# Patient Record
Sex: MALE | Race: WHITE | URBAN - METROPOLITAN AREA
[De-identification: names, ages, dates, MRNs, and addresses within clinical notes are randomized per-mention and may not be internally consistent; named-entity substitution may affect disease eponyms.]

---

## 2017-03-23 DIAGNOSIS — I78.1 NEVUS, NON-NEOPLASTIC: ICD-10-CM

## 2017-03-23 LAB — HEMOCCULT STL QL IA: ABNORMAL

## 2017-03-24 ENCOUNTER — MYC MEDICAL ADVICE (OUTPATIENT)
Dept: LAB | Facility: CLINIC | Age: 52
End: 2017-03-24

## 2017-03-24 NOTE — TELEPHONE ENCOUNTER
FIT test given to patient in 2016  in 2016, received at hospital this week. Test canceled.  Ikro message sent to patient.

## 2017-04-21 ENCOUNTER — OFFICE VISIT (OUTPATIENT)
Dept: FAMILY MEDICINE | Facility: CLINIC | Age: 52
End: 2017-04-21
Payer: COMMERCIAL

## 2017-04-21 DIAGNOSIS — Z83.49 FAMILY HISTORY OF HEMOCHROMATOSIS: ICD-10-CM

## 2017-04-21 DIAGNOSIS — D23.5 BENIGN NEOPLASM OF SKIN OF TRUNK, EXCEPT SCROTUM: ICD-10-CM

## 2017-04-21 DIAGNOSIS — Z12.11 SPECIAL SCREENING FOR MALIGNANT NEOPLASMS, COLON: ICD-10-CM

## 2017-04-21 DIAGNOSIS — Z13.6 CARDIOVASCULAR SCREENING; LDL GOAL LESS THAN 160: ICD-10-CM

## 2017-04-21 DIAGNOSIS — N52.9 ERECTILE DYSFUNCTION, UNSPECIFIED ERECTILE DYSFUNCTION TYPE: ICD-10-CM

## 2017-04-21 DIAGNOSIS — R03.0 ELEVATED BLOOD PRESSURE READING WITHOUT DIAGNOSIS OF HYPERTENSION: Primary | ICD-10-CM

## 2017-04-21 LAB — TRANSFERRIN SERPL-MCNC: 301 MG/DL (ref 210–360)

## 2017-04-21 PROCEDURE — 84466 ASSAY OF TRANSFERRIN: CPT | Performed by: FAMILY MEDICINE

## 2017-04-21 PROCEDURE — 36415 COLL VENOUS BLD VENIPUNCTURE: CPT | Performed by: FAMILY MEDICINE

## 2017-04-21 PROCEDURE — 99214 OFFICE O/P EST MOD 30 MIN: CPT | Performed by: FAMILY MEDICINE

## 2017-04-21 PROCEDURE — 82728 ASSAY OF FERRITIN: CPT | Performed by: FAMILY MEDICINE

## 2017-04-21 PROCEDURE — 80076 HEPATIC FUNCTION PANEL: CPT | Performed by: FAMILY MEDICINE

## 2017-04-21 RX ORDER — TADALAFIL 5 MG/1
5 TABLET ORAL DAILY
Qty: 30 TABLET | Refills: 3 | Status: SHIPPED | OUTPATIENT
Start: 2017-04-21 | End: 2017-08-03

## 2017-04-21 NOTE — NURSING NOTE
"Chief Complaint   Patient presents with     Hypertension     follow up     Mole     screening        Initial /90 (BP Location: Left arm, Patient Position: Chair, Cuff Size: Adult Regular)  Pulse 81  Temp 99.1  F (37.3  C) (Tympanic)  Ht 6' 0.9\" (1.852 m)  Wt 205 lb (93 kg)  SpO2 97%  BMI 27.12 kg/m2 Estimated body mass index is 27.12 kg/(m^2) as calculated from the following:    Height as of this encounter: 6' 0.9\" (1.852 m).    Weight as of this encounter: 205 lb (93 kg).  Medication Reconciliation: complete Johnson Villavicencio MA      "

## 2017-04-21 NOTE — PROGRESS NOTES
"  SUBJECTIVE:                                                    Dar Goodwin is a 51 year old male who presents to clinic today for the following health issues:      Hypertension Follow-up      Outpatient blood pressures are not being checked.    Low Salt Diet: not monitoring salt       Amount of exercise or physical activity: 6-7 days/week for an average of 45-60 minutes    Problems taking medications regularly: No    Medication side effects: none    Diet: regular (no restrictions)      Pt would like some moles to be look at; stomach area.     Also, pt bought in some lab result from work to review.     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  Happy with that.   Family history of hemochromatosis: in mother.  Gets phelbotomy.  Wonders if needs screening.    Benign neoplasm of skin of trunk, except scrotum: has some \"moles\" on stomach he would like looked at.  Have not changed that he knows of.   CARDIOVASCULAR SCREENING; LDL GOAL LESS THAN 160  Special screening for malignant neoplasms, colon  Erectile dysfunction, unspecified erectile dysfunction type :rx desired.  Expensive.      Medical, social, surgical, and family histories reviewed.      REVIEW OF SYSTEMS FOR GENERAL, RESPIRATORY, CV, GI AND PSYCHIATRIC NEGATIVE EXCEPT AS NOTED IN HPI.         OBJECTIVE:  /90 (BP Location: Left arm, Patient Position: Chair, Cuff Size: Adult Regular)  Pulse 81  Temp 99.1  F (37.3  C) (Tympanic)  Ht 6' 0.9\" (1.852 m)  Wt 205 lb (93 kg)  SpO2 97%  BMI 27.12 kg/m2    EXAM:  GENERAL APPEARANCE: healthy, alert and no distress  RESP: lungs clear to auscultation - no rales, rhonchi or wheezes  CV: regular rates and rhythm, normal S1 S2, no S3 or S4 and no murmur, click or rub "   ABDOMEN:  On left lower abdomen has two 4-5 mm round, slightly raise, waxy slightly verrucous lesions consistent with seborrheic keratoses.    blood pressue re-check 134/78 by me.     ASSESSMENT AND PLAN  Patient Instructions   ASSESSMENT AND PLAN  1. Elevated blood pressure reading without diagnosis of hypertension  MARY ANNE check 128/76 and other checks at goal he has done.  Diastolic number just slightly high today initially.     Follow up 6-12 months, no medications for now.       2. Family history of hemochromatosis  Check ferritin, if high will do genetic testing. Discussed risks/benefits of genetic testing including having  A pre-existing condition.     Please ask your mother's hemachromatosis provider if you should have the genetic testing even if the ferritin is low.     - Ferritin    3. Benign neoplasm of skin of trunk, except scrotum  Reassurance, have appearance of seborrheic keratosis on abdomen.  If changing substantially then let me know and we will biopsy.     4. CARDIOVASCULAR SCREENING; LDL GOAL LESS THAN 160      5. Special screening for malignant neoplasms, colon  Fit test now and will still plan to do colonoscopy.       More than 1/2 of 25 minutes spent counseling/coordination of care today.       Joel Wegener,MD

## 2017-04-21 NOTE — PATIENT INSTRUCTIONS
ASSESSMENT AND PLAN  1. Elevated blood pressure reading without diagnosis of hypertension  MARY ANNE check 128/76 and other checks at goal he has done.  Diastolic number just slightly high today.     Follow up 6-12 months, no medications for now.       2. Family history of hemochromatosis  Check ferritin, if high will do genetic testing.     Please ask your mother's hemachromatosis provider if you should have the genetic testing even if the ferritin is low.     - Ferritin    3. Benign neoplasm of skin of trunk, except scrotum  Reassurance, have appearance of seborrheic keratosis on abdomen.  If changing substantially then let me know and we will biopsy.     4. CARDIOVASCULAR SCREENING; LDL GOAL LESS THAN 160      5. Special screening for malignant neoplasms, colon  Fit test now and will still plan to do colonoscopy.

## 2017-04-21 NOTE — TELEPHONE ENCOUNTER
Medication Detail      Disp Refills Start End MARIO   tadalafil (CIALIS) 5 MG tablet (Discontinued) 30 tablet 8 4/20/2016 4/21/2017 No   Sig: Take 1 tablet (5 mg) by mouth daily Never use with nitroglycerin, terazosin or doxazosin. Due for follow up.   Class: E-Prescribe   Route: Oral   Reason for Discontinue: Reorder   Order: [id number]       Last Office Visit with [facility] or  [employer] prescribing provider: 4/21/2017  Future Office visit:       Routing refill request to provider for review/approval because:  Drug not on the [facility], [facility] or [employer] refill protocol or controlled substance

## 2017-04-21 NOTE — MR AVS SNAPSHOT
After Visit Summary   4/21/2017    Dar Goodwin    MRN: [id number]           Patient Information     Date Of Birth          1965        Visit Information        Provider Department      4/21/2017 3:00 PM Wegener, Joel Daniel Irwin, MD [facility]        Today's Diagnoses     Elevated blood pressure reading without diagnosis of hypertension    -  1    Family history of hemochromatosis        Benign neoplasm of skin of trunk, except scrotum        CARDIOVASCULAR SCREENING; LDL GOAL LESS THAN 160        Special screening for malignant neoplasms, colon          Care Instructions    ASSESSMENT AND PLAN  1. Elevated blood pressure reading without diagnosis of hypertension  MARY ANNE check 128/76 and other checks at goal he has done.  Diastolic number just slightly high today.     Follow up 6-12 months, no medications for now.       2. Family history of hemochromatosis  Check ferritin, if high will do genetic testing.     Please ask your mother's hemachromatosis provider if you should have the genetic testing even if the ferritin is low.     - Ferritin    3. Benign neoplasm of skin of trunk, except scrotum  Reassurance, have appearance of seborrheic keratosis on abdomen.  If changing substantially then let me know and we will biopsy.     4. CARDIOVASCULAR SCREENING; LDL GOAL LESS THAN 160      5. Special screening for malignant neoplasms, colon  Fit test now and will still plan to do colonoscopy.             Follow-ups after your visit        Who to contact     If you have questions or need follow up information about today's clinic visit or your schedule please contact [facility] directly at [phone].  Normal or non-critical lab and imaging results will be communicated to you by MyChart, letter or phone within 4 business days after the clinic has received the results. If you do not hear from us within 7 days, please contact the clinic through MyChart or phone. If you have  "a critical or abnormal lab result, we will notify you by phone as soon as possible.  Submit refill requests through [employer] or call your pharmacy and they will forward the refill request to us. Please allow 3 business days for your refill to be completed.          Additional Information About Your Visit        [employer] Information     [employer] gives you secure access to your electronic health record. If you see a primary care provider, you can also send messages to your care team and make appointments. If you have questions, please call your primary care clinic.  If you do not have a primary care provider, please call [phone] and they will assist you.        Care EveryWhere ID     This is your Care EveryWhere ID. This could be used by other organizations to access your [place] medical records  [id number]        Your Vitals Were     Pulse Temperature Height Pulse Oximetry BMI (Body Mass Index)       81 99.1  F (37.3  C) (Tympanic) 6' 0.9\" (1.852 m) 97% 27.12 kg/m2        Blood Pressure from Last 3 Encounters:   04/21/17 130/90   01/22/16 140/85   10/20/14 (!) 140/96    Weight from Last 3 Encounters:   04/21/17 205 lb (93 kg)   01/22/16 207 lb (93.9 kg)   10/20/14 199 lb (90.3 kg)              We Performed the Following     Ferritin        Primary Care Provider Office Phone # Fax #    Joel Daniel Irwin Wegener, [phone]       [address] [address]        Thank you!     Thank you for choosing [facility]  for your care. Our goal is always to provide you with excellent care. Hearing back from our patients is one way we can continue to improve our services. Please take a few minutes to complete the written survey that you may receive in the mail after your visit with us. Thank you!             Your Updated Medication List - Protect others around you: Learn how to safely use, store and throw away your medicines at www.disposemymeds.org.          This " list is accurate as of: 4/21/17  3:27 PM.  Always use your most recent med list.                   Brand Name Dispense Instructions for use    ALEVE PO          dexamethasone 4 MG/ML injection    DECADRON    2 mL    Inject 1 mL (4 mg) as directed once for 1 dose       tadalafil 5 MG tablet    CIALIS    30 tablet    Take 1 tablet (5 mg) by mouth daily Never use with nitroglycerin, terazosin or doxazosin. Due for follow up.

## 2017-04-22 LAB
ALBUMIN SERPL-MCNC: 4.7 G/DL (ref 3.4–5)
ALP SERPL-CCNC: 89 U/L (ref 40–150)
ALT SERPL W P-5'-P-CCNC: 34 U/L (ref 0–70)
AST SERPL W P-5'-P-CCNC: 24 U/L (ref 0–45)
BILIRUB DIRECT SERPL-MCNC: 0.2 MG/DL (ref 0–0.2)
BILIRUB SERPL-MCNC: 0.8 MG/DL (ref 0.2–1.3)
FERRITIN SERPL-MCNC: 250 NG/ML (ref 26–388)
PROT SERPL-MCNC: 8.4 G/DL (ref 6.8–8.8)

## 2017-04-23 VITALS
HEIGHT: 73 IN | WEIGHT: 205 LBS | OXYGEN SATURATION: 97 % | HEART RATE: 81 BPM | SYSTOLIC BLOOD PRESSURE: 134 MMHG | BODY MASS INDEX: 27.17 KG/M2 | TEMPERATURE: 99.1 F | DIASTOLIC BLOOD PRESSURE: 78 MMHG

## 2017-04-24 NOTE — TELEPHONE ENCOUNTER
This was just filled on 4/21/17 (Friday)  This refill is saying a PA is needed.  Provider please sign refill to start PA if that is plan.  Phyllis Cox RN

## 2017-04-25 NOTE — TELEPHONE ENCOUNTER
As far as I know a prior auth is only allowed for pulmonary hypertension and/or BENIGN PROSTATIC HYPERTROPHY if three other standard BENIGN PROSTATIC HYPERTROPHY medications have been tried so generally don't do prior auth's for this.     Does he know differently and want me to try?     Joel Wegener,MD

## 2017-04-27 RX ORDER — TADALAFIL 5 MG/1
5 TABLET ORAL DAILY
Qty: 30 TABLET | Refills: 3 | Status: CANCELLED | OUTPATIENT
Start: 2017-04-27

## 2017-04-27 NOTE — TELEPHONE ENCOUNTER
Correct, insurance does not cover this medication for Erectile Dysfunction.  Patient called and is aware that he will need to pay earl.  Dr. Wegener, it looks like you escribed this RX, but there is still an open order for a refill.  Can you please delete and close encounter?  Thank you.    Marifer Alatorre CMA

## 2017-05-01 ENCOUNTER — TRANSFERRED RECORDS (OUTPATIENT)
Dept: HEALTH INFORMATION MANAGEMENT | Facility: CLINIC | Age: 52
End: 2017-05-01

## 2017-05-04 PROCEDURE — 82274 ASSAY TEST FOR BLOOD FECAL: CPT | Performed by: FAMILY MEDICINE

## 2017-05-05 DIAGNOSIS — Z12.11 SPECIAL SCREENING FOR MALIGNANT NEOPLASMS, COLON: ICD-10-CM

## 2017-05-07 LAB — HEMOCCULT STL QL IA: NEGATIVE

## 2017-08-03 DIAGNOSIS — N52.9 ERECTILE DYSFUNCTION, UNSPECIFIED ERECTILE DYSFUNCTION TYPE: ICD-10-CM

## 2017-08-03 RX ORDER — TADALAFIL 5 MG/1
5 TABLET ORAL DAILY
Qty: 30 TABLET | Refills: 11 | Status: SHIPPED | OUTPATIENT
Start: 2017-08-03 | End: 2018-03-02

## 2017-08-03 NOTE — TELEPHONE ENCOUNTER
Insurance does not cover this medication for erectile dysfunction, prior auth is not possible. I put note on prescription.     Joel Wegener,MD

## 2017-08-03 NOTE — TELEPHONE ENCOUNTER
Rx was denied for below medication/s:    Med Prescribed:  CIALIS 5 MG  Reason:  PLAN DOES NOT COVER THIS MED.  Recommendations from Insurance:  PA  Insurance Plan:  NOT GIVEN  Patient ID:  [id number]  BIN/RX#:  [id number]  Phone:  [phone]  Fax:       On current med list:  YES    Would you like to change Rx or start PA. If you would like to start PA please include any pertinent information as to why it is needed, other medications taken and reasons why other medication were discontinued.      Please forward to your MA pool.      Thank you,  Munira LOVE (R)(M)

## 2017-10-24 ENCOUNTER — TRANSFERRED RECORDS (OUTPATIENT)
Dept: HEALTH INFORMATION MANAGEMENT | Facility: CLINIC | Age: 52
End: 2017-10-24

## 2017-10-24 LAB
ALT SERPL-CCNC: 21 U/L (ref 9–46)
AST SERPL-CCNC: 23 U/L (ref 10–35)
CHOLEST SERPL-MCNC: 212 MG/DL
CREAT SERPL-MCNC: 0.96 MG/DL (ref 0.7–1.33)
GFR SERPL CREATININE-BSD FRML MDRD: 90 ML/MIN/1.73M2
GLUCOSE SERPL-MCNC: 89 MG/DL (ref 65–99)
HDLC SERPL-MCNC: 44 MG/DL
LDLC SERPL CALC-MCNC: 142 MG/DL
NONHDLC SERPL-MCNC: 168 MG/DL
POTASSIUM SERPL-SCNC: 4.2 MMOL/L (ref 3.5–5.3)
TRIGL SERPL-MCNC: 139 MG/DL

## 2017-11-07 ENCOUNTER — OFFICE VISIT (OUTPATIENT)
Dept: URGENT CARE | Facility: URGENT CARE | Age: 52
End: 2017-11-07
Payer: COMMERCIAL

## 2017-11-07 VITALS
OXYGEN SATURATION: 99 % | HEART RATE: 107 BPM | TEMPERATURE: 98.4 F | SYSTOLIC BLOOD PRESSURE: 130 MMHG | DIASTOLIC BLOOD PRESSURE: 90 MMHG

## 2017-11-07 DIAGNOSIS — J00 ACUTE NASOPHARYNGITIS: Primary | ICD-10-CM

## 2017-11-07 PROCEDURE — 99213 OFFICE O/P EST LOW 20 MIN: CPT | Performed by: FAMILY MEDICINE

## 2017-11-07 RX ORDER — DIPHENHYDRAMINE HYDROCHLORIDE AND LIDOCAINE HYDROCHLORIDE AND ALUMINUM HYDROXIDE AND MAGNESIUM HYDRO
5-10 KIT EVERY 6 HOURS PRN
Qty: 237 ML | Refills: 1 | Status: SHIPPED | OUTPATIENT
Start: 2017-11-07

## 2017-11-07 NOTE — MR AVS SNAPSHOT
After Visit Summary   11/7/2017    Dar Goodwin    MRN: [id number]           Patient Information     Date Of Birth          1965        Visit Information        Provider Department      11/7/2017 7:05 PM Danilo Lawton MD [facility]        Today's Diagnoses     Acute nasopharyngitis    -  1       Follow-ups after your visit        Who to contact     If you have questions or need follow up information about today's clinic visit or your schedule please contact [facility] directly at [phone].  Normal or non-critical lab and imaging results will be communicated to you by [employer], letter or phone within 4 business days after the clinic has received the results. If you do not hear from us within 7 days, please contact the clinic through [employer] or phone. If you have a critical or abnormal lab result, we will notify you by phone as soon as possible.  Submit refill requests through [employer] or call your pharmacy and they will forward the refill request to us. Please allow 3 business days for your refill to be completed.          Additional Information About Your Visit        MyChart Information     [employer] gives you secure access to your electronic health record. If you see a primary care provider, you can also send messages to your care team and make appointments. If you have questions, please call your primary care clinic.  If you do not have a primary care provider, please call [phone] and they will assist you.        Care EveryWhere ID     This is your Care EveryWhere ID. This could be used by other organizations to access your [place] medical records  [id number]        Your Vitals Were     Pulse Temperature Pulse Oximetry             107 98.4  F (36.9  C) (Oral) 99%          Blood Pressure from Last 3 Encounters:   11/07/17 130/90   04/21/17 134/78   01/22/16 140/85    Weight from Last 3 Encounters:   04/21/17 205 lb (93 kg)   01/22/16 207 lb  (93.9 kg)   10/20/14 199 lb (90.3 kg)              Today, you had the following     No orders found for display         Today's Medication Changes          These changes are accurate as of: 11/7/17 11:59 PM.  If you have any questions, ask your nurse or doctor.               Start taking these medicines.        Dose/Directions    FIRST-MOUTHWASH BLM MT Susp compounding kit   Used for:  Acute nasopharyngitis   Started by:  Danilo Lawton MD        Dose:  5-10 mL   Swish and swallow 5-10 mLs in mouth every 6 hours as needed for mouth sores   Quantity:  237 mL   Refills:  1            Where to get your medicines      These medications were sent to [facility] [address] [facility] [address] AT [facility] [address]  [address], [facility] [address]    Hours:  24-hours Phone:  [phone]     FIRST-MOUTHWASH BLM MT Susp compounding kit                Primary Care Provider Office Phone # Fax #    Joel Daniel Irwin Wegener, [phone] [address]  [facility] [address]        Equal Access to Services     [facility]: Hadii aad ku hadasho Soomaali, waaxda luqadaha, qaybta kaalmada adeegyada, waxay idiin hayaan adeeg kharash la'hajan ah. So [facility] [phone].    ATENCIÓN: Si habla español, tiene a schneider disposición servicios gratuitos de asistencia lingüística. [facility] [phone].    We comply with applicable federal civil rights laws and [place] laws. We do not discriminate on the basis of race, color, national origin, age, disability, sex, sexual orientation, or gender identity.            Thank you!     Thank you for choosing [facility]RGENT [facility]ARE  for your care. Our goal is always to provide you with excellent care. Hearing back from our patients is one way we can continue to improve our services. Please take a few minutes to complete the written survey that you may receive in the mail after your visit with us. Thank you!             Your Updated  Medication List - Protect others around you: Learn how to safely use, store and throw away your medicines at www.disposemymeds.org.          This list is accurate as of: 11/7/17 11:59 PM.  Always use your most recent med list.                   Brand Name Dispense Instructions for use Diagnosis    ALEVE PO           dexamethasone 4 MG/ML injection    DECADRON    2 mL    Inject 1 mL (4 mg) as directed once for 1 dose    Impingement syndrome, shoulder, right       FIRST-MOUTHWASH BLM MT Susp compounding kit     237 mL    Swish and swallow 5-10 mLs in mouth every 6 hours as needed for mouth sores    Acute nasopharyngitis       tadalafil 5 MG tablet    CIALIS    30 tablet    Take 1 tablet (5 mg) by mouth daily Never use with nitroglycerin, terazosin or doxazosin. Due for follow up.    Erectile dysfunction, unspecified erectile dysfunction type

## 2017-11-08 NOTE — NURSING NOTE
"Chief Complaint   Patient presents with     Urgent Care     Sick     Sick for 1 week       Initial /90 (BP Location: Right arm, Patient Position: Chair, Cuff Size: Adult Regular)  Pulse 107  Temp 98.4  F (36.9  C) (Oral)  SpO2 99% Estimated body mass index is 27.12 kg/(m^2) as calculated from the following:    Height as of 4/21/17: 6' 0.9\" (1.852 m).    Weight as of 4/21/17: 205 lb (93 kg).  Medication Reconciliation: complete     Gabriela Tamez CMA (AAMA)        "

## 2017-11-08 NOTE — PROGRESS NOTES
SUBJECTIVE:   Dar Goodwin is a 52 year old male who presents to clinic today for the following health issues:      Pt stated that he has not been feeling better x1 week, low grade fever and feel much better now than last week, still have mouth sore.        Problem list and histories reviewed & adjusted, as indicated.  Additional history:     Patient Active Problem List   Diagnosis     CARDIOVASCULAR SCREENING; LDL GOAL LESS THAN 160     Erectile dysfunction     Shoulder joint pain     No past surgical history on file.    Social History   Substance Use Topics     Smoking status: Never Smoker     Smokeless tobacco: Never Used      Comment: smoked a few times when he was younger, he has one cigar a month     Alcohol use Yes      Comment: 2-3 drinks per week     Family History   Problem Relation Age of Onset     Circulatory Father      aortic problem     CEREBROVASCULAR DISEASE Father      GASTROINTESTINAL DISEASE Father      hole in bowel             Reviewed and updated as needed this visit by clinical staff       Reviewed and updated as needed this visit by Provider         ROS:  Constitutional, HEENT, cardiovascular, pulmonary, gi and gu systems are negative, except as otherwise noted.      OBJECTIVE:   /90 (BP Location: Right arm, Patient Position: Chair, Cuff Size: Adult Regular)  Pulse 107  Temp 98.4  F (36.9  C) (Oral)  SpO2 99%  There is no height or weight on file to calculate BMI.  GENERAL: alert and no distress  HENT: normal cephalic/atraumatic, ear canals and TM's normal, nasal mucosa edematous , rhinorrhea purulent and oropharxnx crowded  NECK: bilateral anterior cervical adenopathy, no asymmetry, masses, or scars and thyroid normal to palpation  RESP: lungs clear to auscultation - no rales, rhonchi or wheezes  CV: regular rate and rhythm, normal S1 S2, no S3 or S4, no murmur, click or rub, no peripheral edema and peripheral pulses strong  ABDOMEN: soft, nontender, no hepatosplenomegaly,  no masses and bowel sounds normal  MS: no gross musculoskeletal defects noted, no edema  NEURO: Normal strength and tone, mentation intact and speech normal    Diagnostic Test Results:  none     ASSESSMENT/PLAN:     1. Viral uri  2. Throat pain  Magic Mouth wash    Dainlo Lawton MD  [facility] URGENT CARE

## 2018-03-02 ENCOUNTER — MYC MEDICAL ADVICE (OUTPATIENT)
Dept: FAMILY MEDICINE | Facility: CLINIC | Age: 53
End: 2018-03-02

## 2018-03-02 ENCOUNTER — OFFICE VISIT (OUTPATIENT)
Dept: FAMILY MEDICINE | Facility: CLINIC | Age: 53
End: 2018-03-02
Payer: COMMERCIAL

## 2018-03-02 VITALS
RESPIRATION RATE: 16 BRPM | HEART RATE: 88 BPM | OXYGEN SATURATION: 100 % | BODY MASS INDEX: 26.72 KG/M2 | SYSTOLIC BLOOD PRESSURE: 149 MMHG | WEIGHT: 202 LBS | TEMPERATURE: 97.8 F | DIASTOLIC BLOOD PRESSURE: 100 MMHG

## 2018-03-02 DIAGNOSIS — Z11.59 ENCOUNTER FOR HCV SCREENING TEST FOR LOW RISK PATIENT: ICD-10-CM

## 2018-03-02 DIAGNOSIS — I10 HYPERTENSION GOAL BP (BLOOD PRESSURE) < 130/80: Primary | ICD-10-CM

## 2018-03-02 DIAGNOSIS — N52.9 ERECTILE DYSFUNCTION, UNSPECIFIED ERECTILE DYSFUNCTION TYPE: ICD-10-CM

## 2018-03-02 DIAGNOSIS — Z83.49 FAMILY HISTORY OF HEMOCHROMATOSIS: ICD-10-CM

## 2018-03-02 PROCEDURE — 99214 OFFICE O/P EST MOD 30 MIN: CPT | Performed by: FAMILY MEDICINE

## 2018-03-02 RX ORDER — SILDENAFIL CITRATE 20 MG/1
TABLET ORAL
Qty: 30 TABLET | Refills: 11 | Status: SHIPPED | OUTPATIENT
Start: 2018-03-02 | End: 2018-05-17

## 2018-03-02 RX ORDER — HYDROCHLOROTHIAZIDE 25 MG/1
25 TABLET ORAL DAILY
Qty: 90 TABLET | Refills: 3 | Status: SHIPPED | OUTPATIENT
Start: 2018-03-02 | End: 2019-02-01

## 2018-03-02 RX ORDER — HYDROCHLOROTHIAZIDE 25 MG/1
25 TABLET ORAL DAILY
Qty: 90 TABLET | Refills: 3 | Status: SHIPPED | OUTPATIENT
Start: 2018-03-02 | End: 2019-04-10

## 2018-03-02 RX ORDER — SILDENAFIL CITRATE 20 MG/1
TABLET ORAL
Qty: 30 TABLET | Refills: 11 | Status: SHIPPED | OUTPATIENT
Start: 2018-03-02 | End: 2018-03-02

## 2018-03-02 NOTE — TELEPHONE ENCOUNTER
Dr Wegener - I called the sildenafil to the pharmacy as a verbal.  It looks like it may need a PA.    The script showed:  E-Prescribing Status: Transmission to pharmacy failed (3/2/2018 10:40 AM CST)   I sent a My Chart message to patient.

## 2018-03-02 NOTE — PROGRESS NOTES
SUBJECTIVE:   Dar Goodwin is a 52 year old male who presents to clinic today for the following health issues:    Hypertension Follow-up      Outpatient blood pressures are not being checked.    Low Salt Diet: low salt      Amount of exercise or physical activity: 5 days/week for an average of greater than 60 minutes    Problems taking medications regularly: n/a    Medication side effects: not applicable    Diet: regular (no restrictions) and low salt        Additional history/life update:       Hypertension goal BP (blood pressure) < 130/80: still high at [facility] despite exercise, good diet.  Thinking needing medication now.   Erectile dysfunction, unspecified erectile dysfunction type: desires generic sildenafil off-label instead due to cost.     Family history of hemochromatosis :going with mother today to hematologist.  Still considering genetic testing.         Problem list, Medication list, Allergies, and Medical/Social/Surgical histories reviewed in [facility] and updated as appropriate.  Labs reviewed in EPIC  BP Readings from Last 3 Encounters:   03/02/18 (!) 149/100   11/07/17 130/90   04/21/17 134/78    Wt Readings from Last 3 Encounters:   03/02/18 202 lb (91.6 kg)   04/21/17 205 lb (93 kg)   01/22/16 207 lb (93.9 kg)                  Patient Active Problem List   Diagnosis     CARDIOVASCULAR SCREENING; LDL GOAL LESS THAN 160     Erectile dysfunction     Shoulder joint pain     Family history of hemochromatosis     History reviewed. No pertinent surgical history.    Social History   Substance Use Topics     Smoking status: Never Smoker     Smokeless tobacco: Never Used      Comment: smoked a few times when he was younger, he has one cigar a month     Alcohol use Yes      Comment: 2-3 drinks per week     Family History   Problem Relation Age of Onset     Circulatory Father      aortic problem     CEREBROVASCULAR DISEASE Father      GASTROINTESTINAL DISEASE Father      hole in bowel         Current  Outpatient Prescriptions   Medication Sig Dispense Refill     hydrochlorothiazide (HYDRODIURIL) 25 MG tablet Take 1 tablet (25 mg) by mouth daily 90 tablet 3     hydrochlorothiazide (HYDRODIURIL) 25 MG tablet Take 1 tablet (25 mg) by mouth daily 90 tablet 3     sildenafil (REVATIO) 20 MG tablet 1-2 tablets daily as needed 15 minutes prior to intercourse. .  Never use with nitroglycerin, terazosin or doxazosin. 30 tablet 11     magic mouthwash suspension (diphenhydramine, lidocaine, aluminum-magnesium & simethicone) Swish and swallow 5-10 mLs in mouth every 6 hours as needed for mouth sores 237 mL 1     Naproxen Sodium (ALEVE PO)        [DISCONTINUED] sildenafil (REVATIO) 20 MG tablet Take 1 tablet (20 mg) by mouth three times daily for pulmonary hypertension.  Never use with nitroglycerin, terazosin or doxazosin. 30 tablet 11     dexamethasone (DECADRON) 4 MG/ML injection Inject 1 mL (4 mg) as directed once for 1 dose 2 mL 0     No Known Allergies  Recent Labs   Lab Test  04/21/17   1532 09/28/16 11/16/15 12/02/14   LDL   --   149*  129  121   HDL   --   42  36  47   TRIG   --   128  127  159   ALT  34  27  17  22   CR   --   0.95  1.11  1.05   GFRESTIMATED   --   92  76   --    GFRESTBLACK   --   106  89   --    POTASSIUM   --   4.1  4.4  4.2        ROS:  Constitutional, HEENT, cardiovascular, pulmonary, GI, , musculoskeletal, neuro, skin, endocrine and psych systems are negative, except as otherwise noted.        OBJECTIVE:  BP (!) 149/100  Pulse 88  Temp 97.8  F (36.6  C) (Oral)  Resp 16  Wt 202 lb (91.6 kg)  SpO2 100%  BMI 26.72 kg/m2    EXAM:  GENERAL APPEARANCE: healthy, alert and no distress      ASSESSMENT AND PLAN  Patient Instructions   ASSESSMENT AND PLAN  1. Hypertension goal BP (blood pressure) < 130/80  Continue low salt diet, exercise.     Start hctz 25 mg daily.     Follow up one month for nurse blood pressue check and lab check (for potassium, ferritin, hep c).       - hydrochlorothiazide  (HYDRODIURIL) 25 MG tablet; Take 1 tablet (25 mg) by mouth daily  Dispense: 90 tablet; Refill: 3  - hydrochlorothiazide (HYDRODIURIL) 25 MG tablet; Take 1 tablet (25 mg) by mouth daily  Dispense: 90 tablet; Refill: 3    2. Erectile dysfunction, unspecified erectile dysfunction type  Change to generic off-label sildenafil  Prior auth not possible fyi.       - sildenafil (REVATIO) 20 MG tablet; 1-2 tablets daily as needed 15 minutes prior to intercourse. .  Never use with nitroglycerin, terazosin or doxazosin.  Dispense: 30 tablet; Refill: 11    Having yearly physicals through [facility].  See scanned labs.     Call number below for colonoscopy.       3. Encounter for HCV screening test for low risk patient    - Hepatitis C Screen Reflex to HCV RNA Quant and Genotype; Future    4. Family history of hemochromatosis    - Ferritin; Future  - Comprehensive metabolic panel (BMP + Alb, Alk Phos, ALT, AST, Total. Bili, TP); Future          Joel Wegener,MD

## 2018-03-02 NOTE — TELEPHONE ENCOUNTER
Oh, I sent as well.     I had given him a hard copy but he may not have realized that.     He knows won't be covered.     Joel Wegener,MD

## 2018-03-02 NOTE — MR AVS SNAPSHOT
After Visit Summary   3/2/2018    Dar Goodwin    MRN: [id number]           Patient Information     Date Of Birth          1965        Visit Information        Provider Department      3/2/2018 10:00 AM Wegener, Joel Daniel Irwin, MD [facility]        Today's Diagnoses     Hypertension goal BP (blood pressure) < 130/80    -  1    Erectile dysfunction, unspecified erectile dysfunction type        Encounter for HCV screening test for low risk patient        Family history of hemochromatosis          Care Instructions    ASSESSMENT AND PLAN  1. Hypertension goal BP (blood pressure) < 130/80  Continue low salt diet, exercise.     Start hctz 25 mg daily.     Follow up one month for nurse blood pressue check and lab check (for potassium, ferritin, hep c).       - hydrochlorothiazide (HYDRODIURIL) 25 MG tablet; Take 1 tablet (25 mg) by mouth daily  Dispense: 90 tablet; Refill: 3  - hydrochlorothiazide (HYDRODIURIL) 25 MG tablet; Take 1 tablet (25 mg) by mouth daily  Dispense: 90 tablet; Refill: 3    2. Erectile dysfunction, unspecified erectile dysfunction type  Change to generic off-label sildenafil  Prior auth not possible fyi.       - sildenafil (REVATIO) 20 MG tablet; 1-2 tablets daily as needed 15 minutes prior to intercourse. .  Never use with nitroglycerin, terazosin or doxazosin.  Dispense: 30 tablet; Refill: 11    [facility]  See scanned labs.     Call number below for colonoscopy.       HILARIO SIGNUP FOR E-VISITS AND EASIER COMMUNICATION:  [email or web address]     Zipnosis:  [email or web address].  Sign up for e-visits for common illnesses!     RADIOLOGY:   [facility]:  [phone] to schedule any radiology tests at [facility]: [phone]    Mammograms/colonoscopies:  [phone]    CONSUMER PRICE LINE for estimates of test costs:  [phone]               Follow-ups after your visit        Your next 10  appointments already scheduled     Apr 06, 2018 10:00 AM CDT   Nurse Only with  MEDICAL ASSISTANT   [facility] ([facility])    [address] [place] [id number] [phone]            Apr 06, 2018 10:30 AM CDT   LAB with  LAB   [facility] ([facility])    [address] [place] [id number] [phone]           Please do not eat 10-12 hours before your appointment if you are coming in fasting for labs on lipids, cholesterol, or glucose (sugar). This does not apply to pregnant women. Water, hot tea and black coffee (with nothing added) are okay. Do not drink other fluids, diet soda or chew gum.              Future tests that were ordered for you today     Open Future Orders        Priority Expected Expires Ordered    Albumin Random Urine Quantitative with Creat Ratio Routine 4/2/2018 3/2/2019 3/2/2018    Hepatitis C Screen Reflex to HCV RNA Quant and Genotype Routine 4/2/2018 9/2/2018 3/2/2018    Ferritin Routine 4/2/2018 9/2/2018 3/2/2018    Comprehensive metabolic panel (BMP + Alb, Alk Phos, ALT, AST, Total. Bili, TP) Routine 4/2/2018 9/2/2018 3/2/2018            Who to contact     If you have questions or need follow up information about today's clinic visit or your schedule please contact [facility] directly at [phone].  Normal or non-critical lab and imaging results will be communicated to you by MyChart, letter or phone within 4 business days after the clinic has received the results. If you do not hear from us within 7 days, please contact the clinic through MyChart or phone. If you have a critical or abnormal lab result, we will notify you by phone as soon as possible.  Submit refill requests through [employer] or call your pharmacy and they will forward the refill request to us. Please allow 3 business days for your refill to be completed.          Additional Information About Your Visit         Ahaali Information     [employer] gives you secure access to your electronic health record. If you see a primary care provider, you can also send messages to your care team and make appointments. If you have questions, please call your primary care clinic.  If you do not have a primary care provider, please call [phone] and they will assist you.        Care EveryWhere ID     This is your Care EveryWhere ID. This could be used by other organizations to access your [place] medical records  [id number]        Your Vitals Were     Pulse Temperature Respirations Pulse Oximetry BMI (Body Mass Index)       88 97.8  F (36.6  C) (Oral) 16 100% 26.72 kg/m2        Blood Pressure from Last 3 Encounters:   03/02/18 (!) 149/100   11/07/17 130/90   04/21/17 134/78    Weight from Last 3 Encounters:   03/02/18 202 lb (91.6 kg)   04/21/17 205 lb (93 kg)   01/22/16 207 lb (93.9 kg)                 Today's Medication Changes          These changes are accurate as of 3/2/18 10:50 AM.  If you have any questions, ask your nurse or doctor.               Start taking these medicines.        Dose/Directions    * hydrochlorothiazide 25 MG tablet   Commonly known as:  HYDRODIURIL   Used for:  Hypertension goal BP (blood pressure) < 130/80   Started by:  Wegener, Joel Daniel Irwin, MD        Dose:  25 mg   Take 1 tablet (25 mg) by mouth daily   Quantity:  90 tablet   Refills:  3       * hydrochlorothiazide 25 MG tablet   Commonly known as:  HYDRODIURIL   Used for:  Hypertension goal BP (blood pressure) < 130/80   Started by:  Wegener, Joel Daniel Irwin, MD        Dose:  25 mg   Take 1 tablet (25 mg) by mouth daily   Quantity:  90 tablet   Refills:  3       sildenafil 20 MG tablet   Commonly known as:  REVATIO   Used for:  Erectile dysfunction, unspecified erectile dysfunction type   Started by:  Wegener, Joel Daniel Irwin, MD        1-2 tablets daily as needed 15 minutes prior to intercourse. .  Never use with nitroglycerin, terazosin or  doxazosin.   Quantity:  30 tablet   Refills:  11       * Notice:  This list has 2 medication(s) that are the same as other medications prescribed for you. Read the directions carefully, and ask your doctor or other care provider to review them with you.      Stop taking these medicines if you haven't already. Please contact your care team if you have questions.     tadalafil 5 MG tablet   Commonly known as:  CIALIS   Stopped by:  Wegener, Joel Daniel Irwin, MD                Where to get your medicines      These medications were sent to [facility]/pharmacy #[id number] - [place], [place] - [address]  [address] [address]     Phone:  [phone]     hydrochlorothiazide 25 MG tablet    hydrochlorothiazide 25 MG tablet         Some of these will need a paper prescription and others can be bought over the counter.  Ask your nurse if you have questions.     Bring a paper prescription for each of these medications     sildenafil 20 MG tablet                Primary Care Provider Office Phone # Fax #    Joel Daniel Irwin Wegener, [phone] [address] [place] [address]        Equal Access to Services     [facility] : Hadii aad ku hadasho Soomaali, waaxda luqadaha, qaybta kaalmada adetomyadeon, everardo dallas. So [facility] [phone].    ATENCIÓN: Si habla español, tiene a schneider disposición servicios gratbetzyos de asistencia lingüística. Davian al [phone].    We comply with applicable federal civil rights laws and [place] laws. We do not discriminate on the basis of race, color, national origin, age, disability, sex, sexual orientation, or gender identity.            Thank you!     Thank you for choosing [facility]  for your care. Our goal is always to provide you with excellent care. Hearing back from our patients is one way we can continue to improve our services. Please take a few minutes to complete the written survey that you may  receive in the mail after your visit with us. Thank you!             Your Updated Medication List - Protect others around you: Learn how to safely use, store and throw away your medicines at www.disposemymeds.org.          This list is accurate as of 3/2/18 10:50 AM.  Always use your most recent med list.                   Brand Name Dispense Instructions for use Diagnosis    ALEVE PO           dexamethasone 4 MG/ML injection    DECADRON    2 mL    Inject 1 mL (4 mg) as directed once for 1 dose    Impingement syndrome, shoulder, right       * hydrochlorothiazide 25 MG tablet    HYDRODIURIL    90 tablet    Take 1 tablet (25 mg) by mouth daily    Hypertension goal BP (blood pressure) < 130/80       * hydrochlorothiazide 25 MG tablet    HYDRODIURIL    90 tablet    Take 1 tablet (25 mg) by mouth daily    Hypertension goal BP (blood pressure) < 130/80       magic mouthwash suspension (diphenhydrAMINE, lidocaine, aluminum-magnesium & simethicone) Susp compounding kit     237 mL    Swish and swallow 5-10 mLs in mouth every 6 hours as needed for mouth sores    Acute nasopharyngitis       sildenafil 20 MG tablet    REVATIO    30 tablet    1-2 tablets daily as needed 15 minutes prior to intercourse. .  Never use with nitroglycerin, terazosin or doxazosin.    Erectile dysfunction, unspecified erectile dysfunction type       * Notice:  This list has 2 medication(s) that are the same as other medications prescribed for you. Read the directions carefully, and ask your doctor or other care provider to review them with you.

## 2018-03-02 NOTE — LETTER
[facility]  [address] [place] [address] [phone]        September 7, 2018    Dar Goodwin  [address] [facility] [address]              Dear Dar Goodwin    This is to remind you that your non-fasting lab is due.    You may call our office at [phone] to schedule an appointment.    Please disregard this notice if you have already had your labs drawn or made an appointment.        Sincerely,        Joel Daniel Irwin Wegener MD

## 2018-05-16 ENCOUNTER — MYC MEDICAL ADVICE (OUTPATIENT)
Dept: FAMILY MEDICINE | Facility: CLINIC | Age: 53
End: 2018-05-16

## 2018-05-16 DIAGNOSIS — N52.9 ERECTILE DYSFUNCTION, UNSPECIFIED ERECTILE DYSFUNCTION TYPE: ICD-10-CM

## 2018-05-17 RX ORDER — TADALAFIL 5 MG/1
5 TABLET ORAL DAILY
Qty: 30 TABLET | Refills: 1 | Status: SHIPPED | OUTPATIENT
Start: 2018-05-17 | End: 2018-12-05

## 2018-05-17 NOTE — TELEPHONE ENCOUNTER
Patient has been trying Revatio  Pended order for Cialis.  Cialis is not on current med list  Sildenafil would need to be removed also

## 2018-05-31 ENCOUNTER — TELEPHONE (OUTPATIENT)
Dept: FAMILY MEDICINE | Facility: CLINIC | Age: 53
End: 2018-05-31

## 2018-05-31 NOTE — TELEPHONE ENCOUNTER
Prior Authorization Retail Medication Request    Medication/Dose: sildenafil (REVATIO) 20 MG tablet (Discontinued)  ICD code (if different than what is on RX):  Previously Tried and Failed:  Rationale:    Insurance Name: [employer]  Insurance ID: [id number]    Pharmacy Information (if different than what is on RX)  Name:  Phone:    Please include previous medications tried and failed.  Please ask insurance for medications on formulary.

## 2018-06-01 ENCOUNTER — TELEPHONE (OUTPATIENT)
Dept: FAMILY MEDICINE | Facility: CLINIC | Age: 53
End: 2018-06-01

## 2018-06-01 NOTE — TELEPHONE ENCOUNTER
Prior Authorization Retail Medication Request    Medication/Dose: sildenafil 20 mg  ICD code (if different than what is on RX):  Previously Tried and Failed:  Rationale:    Insurance Name:    Insurance ID:      Pharmacy Information (if different than what is on RX)  Name:  Phone:    Please include previous medications tried and failed.  Please ask insurance for medications on formulary.    Visit [employer]/[place] [facility] and enter TRX code: [id number].

## 2018-06-01 NOTE — TELEPHONE ENCOUNTER
This medication was discontinued. Patient was prescribed Cialis.  ED medications are excluded from plan.

## 2018-06-04 NOTE — TELEPHONE ENCOUNTER
Central Prior Authorization Team   Phone: [phone]    This is a duplicate request.   Please see previous encounter.

## 2018-10-15 ENCOUNTER — TELEPHONE (OUTPATIENT)
Dept: LAB | Facility: CLINIC | Age: 53
End: 2018-10-15

## 2018-10-15 NOTE — TELEPHONE ENCOUNTER
Labs on 3/2/18 have  and letter was sent on 18.    Please close (not just done) this encounter if nothing more needs to be done with it.    Thanks,  lab

## 2018-12-05 ENCOUNTER — MYC REFILL (OUTPATIENT)
Dept: FAMILY MEDICINE | Facility: CLINIC | Age: 53
End: 2018-12-05

## 2018-12-05 DIAGNOSIS — N52.9 ERECTILE DYSFUNCTION, UNSPECIFIED ERECTILE DYSFUNCTION TYPE: ICD-10-CM

## 2018-12-05 RX ORDER — TADALAFIL 5 MG/1
5 TABLET ORAL DAILY
Qty: 30 TABLET | Refills: 0 | Status: SHIPPED | OUTPATIENT
Start: 2018-12-05 | End: 2018-12-05

## 2018-12-05 NOTE — TELEPHONE ENCOUNTER
Pending Prescriptions:                       Disp   Refills    tadalafil (CIALIS) 5 MG tablet            30 tab*0            Sig: Take 1 tablet (5 mg) by mouth daily Never use           with nitroglycerin, terazosin or doxazosin. Due           for yearly physical and blood pressure check    Refilled per [facility] standing order        Odilia Dukes Registered Nurse   [facility]

## 2018-12-06 ENCOUNTER — TELEPHONE (OUTPATIENT)
Dept: FAMILY MEDICINE | Facility: CLINIC | Age: 53
End: 2018-12-06

## 2018-12-06 NOTE — TELEPHONE ENCOUNTER
Prior Authorization Retail Medication Request    Medication/Dose: Tadalafil  ICD code (if different than what is on RX):  Previously Tried and Failed:  Rationale:    Insurance Name: unknown  Insurance ID: unknown    Pharmacy Information (if different than what is on RX)  Name: [facility]  Phone:  [phone]    Please include previous medications tried and failed.  Please ask insurance for medications on formulary.

## 2018-12-07 NOTE — TELEPHONE ENCOUNTER
PRIOR AUTHORIZATION DENIED    Medication: Tadalafil-DENIED    Denial Date: 12/6/2018    Denial Rational: Use of medication for ED is excluded from coverage.        Appeal Information:     A Written request must come FROM THE MEMBER.

## 2018-12-11 NOTE — TELEPHONE ENCOUNTER
Please let pt know cialis not covered and prior auth not possible.  Would have to buy out of pocket.  Same would be true for any erectile dysfunction medication.     Joel Wegener,MD

## 2018-12-11 NOTE — TELEPHONE ENCOUNTER
PA was denied. Please order alternative med with complete SIG or begin appeal process.     If you would like to appeal:   Create letter of medical necessity or    Compile supporting clinical documentation in EPIC Telephone encounter (TE).    Route TE to: [facility].

## 2019-01-28 DIAGNOSIS — I10 HYPERTENSION GOAL BP (BLOOD PRESSURE) < 130/80: ICD-10-CM

## 2019-01-28 NOTE — TELEPHONE ENCOUNTER
"Requested Prescriptions   Pending Prescriptions Disp Refills     hydrochlorothiazide (HYDRODIURIL) 25 MG tablet  Last Written Prescription Date:  3/2/2018  Last Fill Quantity: 90 tablet,  # refills: 3   Last Office Visit: 3/2/2018   Future Office Visit:      90 tablet 3     Sig: Take 1 tablet (25 mg) by mouth daily    Diuretics (Including Combos) Protocol Failed - 1/28/2019 10:43 AM       Failed - Blood pressure under 140/90 in past 12 months    BP Readings from Last 3 Encounters:   03/02/18 (!) 149/100   11/07/17 130/90   04/21/17 134/78          Failed - Normal serum creatinine on file in past 12 months    Recent Labs   Lab Test 10/24/17   CR 0.96           Failed - Normal serum potassium on file in past 12 months    Recent Labs   Lab Test 10/24/17   POTASSIUM 4.2           Failed - Normal serum sodium on file in past 12 months    Recent Labs   Lab Test 04/03/12              Passed - Recent (12 mo) or future (30 days) visit within the authorizing provider's specialty    Patient had office visit in the last 12 months or has a visit in the next 30 days with authorizing provider or within the authorizing provider's specialty.  See \"Patient Info\" tab in inbasket, or \"Choose Columns\" in Meds & Orders section of the refill encounter.           Passed - Medication is active on med list       Passed - Patient is age 18 or older          "

## 2019-01-30 NOTE — TELEPHONE ENCOUNTER
"Requested Prescriptions   Pending Prescriptions Disp Refills     hydrochlorothiazide (HYDRODIURIL) 25 MG tablet [Pharmacy Med Name: HYDROCHLOROTHIAZIDE 25 MG TAB]  Last Written Prescription Date:  3/2/2018  Last Fill Quantity: 90 tablet,  # refills: 3   Last Office Visit: 3/2/2018   Future Office Visit:      90 tablet 1     Sig: TAKE 1 TABLET (25 MG) BY MOUTH DAILY    Diuretics (Including Combos) Protocol Failed - 1/30/2019  2:09 AM       Failed - Blood pressure under 140/90 in past 12 months    BP Readings from Last 3 Encounters:   03/02/18 (!) 149/100   11/07/17 130/90   04/21/17 134/78                Failed - Normal serum creatinine on file in past 12 months    Recent Labs   Lab Test 10/24/17   CR 0.96             Failed - Normal serum potassium on file in past 12 months    Recent Labs   Lab Test 10/24/17   POTASSIUM 4.2                   Failed - Normal serum sodium on file in past 12 months    Recent Labs   Lab Test 04/03/12                Passed - Recent (12 mo) or future (30 days) visit within the authorizing provider's specialty    Patient had office visit in the last 12 months or has a visit in the next 30 days with authorizing provider or within the authorizing provider's specialty.  See \"Patient Info\" tab in inbasket, or \"Choose Columns\" in Meds & Orders section of the refill encounter.             Passed - Medication is active on med list       Passed - Patient is age 18 or older          "

## 2019-01-31 NOTE — TELEPHONE ENCOUNTER
Per chart review, patient relocated to [place].    Team coordinators-Please clarify with patient if he has established care with new primary care provider.    Thank you!  SUDHEER StreeterN, RN

## 2019-02-01 RX ORDER — HYDROCHLOROTHIAZIDE 25 MG/1
TABLET ORAL
Qty: 30 TABLET | Refills: 0 | OUTPATIENT
Start: 2019-02-01

## 2019-02-01 RX ORDER — HYDROCHLOROTHIAZIDE 25 MG/1
25 TABLET ORAL DAILY
Qty: 30 TABLET | Refills: 0 | Status: SHIPPED | OUTPATIENT
Start: 2019-02-01 | End: 2019-04-14

## 2019-02-01 NOTE — TELEPHONE ENCOUNTER
Routing refill request to provider for review/approval because:  Blood pressure above threshold.  1 month supply pended.  Phyllis Cox RN

## 2019-02-01 NOTE — TELEPHONE ENCOUNTER
Dr Wegener,    Pt was contacted. He has moved to [place] July 2018.    He has not found doctor out there yet.     Will you refill for 30 days? He states he will get on finding a doctor    (Patient has given permission to leave detailed message on voice mail if no answer on phone.)    Janay Goodwin RN, BSN

## 2019-04-10 DIAGNOSIS — I10 HYPERTENSION GOAL BP (BLOOD PRESSURE) < 130/80: ICD-10-CM

## 2019-04-10 NOTE — TELEPHONE ENCOUNTER
"Requested Prescriptions   Pending Prescriptions Disp Refills     hydrochlorothiazide (HYDRODIURIL) 25 MG tablet 30 tablet 0     Sig: Take 1 tablet (25 mg) by mouth daily  Last Written Prescription Date:  2/1/2019  Last Fill Quantity: 30 tablet,  # refills: 0   Last Office Visit: 3/2/2018   Future Office Visit:            Diuretics (Including Combos) Protocol Failed - 4/10/2019 11:44 AM        Failed - Blood pressure under 140/90 in past 12 months     BP Readings from Last 3 Encounters:   03/02/18 (!) 149/100   11/07/17 130/90   04/21/17 134/78           Failed - Recent (12 mo) or future (30 days) visit within the authorizing provider's specialty     Patient had office visit in the last 12 months or has a visit in the next 30 days with authorizing provider or within the authorizing provider's specialty.  See \"Patient Info\" tab in inbasket, or \"Choose Columns\" in Meds & Orders section of the refill encounter.            Failed - Normal serum creatinine on file in past 12 months     Recent Labs   Lab Test 10/24/17   CR 0.96            Failed - Normal serum potassium on file in past 12 months     Recent Labs   Lab Test 10/24/17   POTASSIUM 4.2            Failed - Normal serum sodium on file in past 12 months     Recent Labs   Lab Test 04/03/12               Passed - Medication is active on med list        Passed - Patient is age 18 or older          "

## 2019-04-14 NOTE — TELEPHONE ENCOUNTER
Routing refill request to provider for review/approval because:  -Myra given x1 and patient did not follow up, please advise  -Labs not current:  Creatinine, potassium, sodium  -Patient needs to be seen because it has been more than 1 year since last office visit.      Dr. Wegener-Please sign if agree.  Two week supply pended.    Team coordinators-Please contact patient to schedule annual office visit.    Thank you!  SUDHEER StreeterN, RN

## 2019-04-15 RX ORDER — HYDROCHLOROTHIAZIDE 25 MG/1
25 TABLET ORAL DAILY
Qty: 14 TABLET | Refills: 0 | Status: SHIPPED | OUTPATIENT
Start: 2019-04-15

## 2019-10-01 ENCOUNTER — HEALTH MAINTENANCE LETTER (OUTPATIENT)
Age: 54
End: 2019-10-01

## 2021-01-15 ENCOUNTER — HEALTH MAINTENANCE LETTER (OUTPATIENT)
Age: 56
End: 2021-01-15

## 2021-09-04 ENCOUNTER — HEALTH MAINTENANCE LETTER (OUTPATIENT)
Age: 56
End: 2021-09-04

## 2022-02-19 ENCOUNTER — HEALTH MAINTENANCE LETTER (OUTPATIENT)
Age: 57
End: 2022-02-19

## 2022-10-16 ENCOUNTER — HEALTH MAINTENANCE LETTER (OUTPATIENT)
Age: 57
End: 2022-10-16

## 2023-04-01 ENCOUNTER — HEALTH MAINTENANCE LETTER (OUTPATIENT)
Age: 58
End: 2023-04-01